# Patient Record
Sex: MALE | Race: WHITE | Employment: FULL TIME | URBAN - METROPOLITAN AREA
[De-identification: names, ages, dates, MRNs, and addresses within clinical notes are randomized per-mention and may not be internally consistent; named-entity substitution may affect disease eponyms.]

---

## 2022-02-28 ENCOUNTER — HOSPITAL ENCOUNTER (OUTPATIENT)
Dept: GENERAL RADIOLOGY | Age: 37
Discharge: HOME | End: 2022-02-28
Payer: COMMERCIAL

## 2022-02-28 ENCOUNTER — TRANSCRIBE ORDER (OUTPATIENT)
Dept: GENERAL RADIOLOGY | Age: 37
End: 2022-02-28

## 2022-02-28 DIAGNOSIS — M54.50 LOW BACK PAIN AT MULTIPLE SITES: ICD-10-CM

## 2022-02-28 DIAGNOSIS — M25.559 HIP PAIN: ICD-10-CM

## 2022-02-28 DIAGNOSIS — M25.559 HIP PAIN: Primary | ICD-10-CM

## 2022-02-28 PROCEDURE — 73502 X-RAY EXAM HIP UNI 2-3 VIEWS: CPT | Performed by: INTERNAL MEDICINE

## 2022-02-28 PROCEDURE — 72100 X-RAY EXAM L-S SPINE 2/3 VWS: CPT | Performed by: INTERNAL MEDICINE

## 2022-04-21 ENCOUNTER — OFFICE VISIT (OUTPATIENT)
Dept: ORTHOPEDIC SURGERY | Age: 37
End: 2022-04-21
Payer: COMMERCIAL

## 2022-04-21 DIAGNOSIS — G89.29 CHRONIC BILATERAL LOW BACK PAIN WITH LEFT-SIDED SCIATICA: Primary | ICD-10-CM

## 2022-04-21 DIAGNOSIS — M51.26 HNP (HERNIATED NUCLEUS PULPOSUS), LUMBAR: ICD-10-CM

## 2022-04-21 DIAGNOSIS — M54.42 CHRONIC BILATERAL LOW BACK PAIN WITH LEFT-SIDED SCIATICA: Primary | ICD-10-CM

## 2022-04-21 PROCEDURE — 99204 OFFICE O/P NEW MOD 45 MIN: CPT | Performed by: PHYSICIAN ASSISTANT

## 2022-04-21 NOTE — PROGRESS NOTES
1. Have you been to the ER, urgent care clinic since your last visit? Hospitalized since your last visit? No    2. Have you seen or consulted any other health care providers outside of the [address] [place] [address] since your last visit? Include any pap smears or colon screening. No       Chief Complaint   Patient presents with    Back Pain     mostly his left hamstring pain    patient reports when he pushes on L5/S1 his pain flares up otherwise he does not have much back pain.        Lesly Morin RN, BSN  Registered Nurse to [facility] [phone]  Direct Line [phone]

## 2022-04-26 NOTE — PROGRESS NOTES
Diana Espinoza (: 1985) is a 30 y.o. male patient here for evaluation of the following chief complaint(s):  Back Pain (mostly his left hamstring pain )         ASSESSMENT/PLAN:  Below is the assessment and plan developed based on review of pertinent history, physical exam, labs, studies, and medications. 1. Chronic bilateral low back pain with left-sided sciatica  2. HNP (herniated nucleus pulposus), lumbar      The patient's radiologic findings have been reviewed with him in detail today. He has had persistent and progressively worsening left lower extremity radiculopathy with intermittent low back pain. He has tried and failed conservative treatment to include 3 months of outpatient physical therapy as well as multiple medications as prescribed by his PCP. I would like for him to obtain MRI of the lumbar spine for further evaluation of an acute HNP. He will return for follow-up visit after his MRI is complete. He may continue using over-the-counter medications as needed for pain relief. Return for MRI follow up. SUBJECTIVE/OBJECTIVE:  Diana Espinoza (: 1985) is a 30 y.o. male who presents today for the following:  Chief Complaint   Patient presents with    Back Pain     mostly his left hamstring pain         HPI   Mr. Mi Lamar presents today as a new patient to the practice. He states that he began with a sudden onset of left-sided buttock and hamstring discomfort without any precipitating event or injury approximately 1 year ago. He states that over , he had decline in his physical activity, and his activity picked up again nearly 1 year ago. He states that by the end of the summer, his symptoms have progressed to left-sided deep buttock discomfort particularly with standing and was intermittent in nature with intermittent low back pain. He has had intermittent pain radiating along the posterior aspect of the leg into the calf and occasionally into the toes.   Reports occasional numbness and tingling in the toes but denies any weakness in the legs. He was seen by his PCP in January and was placed on a Medrol pack, diclofenac, and Flexeril. Unfortunately these provided him without any substantial relief. He is currently only using aspirin at this time. He has completed 3 months of outpatient physical therapy recently with only limited improvement. He continues to exercise regularly. He is here today for further evaluation and treatment recommendations. IMAGING:  XR Results (most recent):  Results from Hospital Encounter encounter on 02/28/22    XR SPINE LUMB 2 OR 3 V    Narrative  INDICATION:  Low back pain at multiple sites. Exam: AP, lateral and cone-down views of the lumbar spine. FINDINGS: There is no acute fracture or subluxation. Intervertebral disc spaces  are well maintained. Bones are well-mineralized. Soft tissues are normal.    Impression  No acute fracture or subluxation. MRI Results (most recent):  No results found for this or any previous visit. No Known Allergies    No current outpatient medications on file. No current facility-administered medications for this visit. Past Medical History:   Diagnosis Date    Arthritis         No past surgical history on file. No family history on file. Social History     Tobacco Use    Smoking status: Never Smoker    Smokeless tobacco: Never Used   Substance Use Topics    Alcohol use: Yes     Alcohol/week: 8.0 standard drinks     Types: 4 Glasses of wine, 4 Cans of beer per week     Comment: per week        Review of Systems   Constitutional: Negative. Respiratory: Negative. Cardiovascular: Negative. Gastrointestinal: Negative. Endocrine: Negative. Genitourinary: Negative. Musculoskeletal: Positive for back pain. Skin: Negative. Allergic/Immunologic: Negative. Hematological: Negative. Psychiatric/Behavioral: Negative.     All other systems reviewed and are negative. ROS     Positive for: Musculoskeletal    Last edited by Bharti Gonzalez RN on 4/21/2022  1:58 PM. (History)         Pain Assessment  4/21/2022   Location of Pain Back   Location Modifiers Left   Severity of Pain 3   Quality of Pain Dull;Aching; Sharp   Duration of Pain A few days   Aggravating Factors Other (Comment); Exercise   Relieving Factors Rest   Relieving Factors Comment laying on abdomin   Result of Injury No   Type of Injury (No Data)   Type of Injury Comment after endurance sports/ biking           Vitals: There were no vitals taken for this visit. There is no height or weight on file to calculate BMI. Physical Exam    Neurologic  Sensory  Light Touch - Intact - Globally. Overall Assessment of Muscle Strength and Tone reveals  Lower Extremities - Right Iliopsoas - 5/5. Left Iliopsoas - 5/5. Right Tibialis Anterior - 5/5. Left Tibialis Anterior - 5/5. Right Gastroc-Soleus - 5/5. Left Gastroc-Soleus - 5/5. Right EHL - 5/5. Left EHL - 4/5. General Assessment of Reflexes  Right Ankle - Clonus is not present. Left Ankle - Clonus is not present. Reflexes (Dermatomes)  2/2 Normal - Left Achilles (L5-S2), Left Knee (L2-4), Right Achilles (L5-S2) and Right Knee (L2-4). Musculoskeletal  Global Assessment  Examination of related systems reveals - well-developed, well-nourished, in no acute distress, alert and oriented x 3. Gait and Station - normal gait and station and normal posture. Right Lower Extremity - normal strength and tone, normal range of motion without pain and no instability, subluxation or laxity. Left Lower Extremity - normal strength and tone, normal range of motion without pain and no instability, subluxation or laxity. Spine/Ribs/Pelvis  Cervical Spine - Examination of the cervical spine reveals - no tenderness to palpation, no pain, no swelling, edema or erythema, normal cervical spine movements and normal sensation.  Thoracic (Dorsal) Spine - Examination of the thoracic spine reveals - no tenderness over thoracic vertebrae, no pain, normal sensation and normal thoracic spine movements. Lumbosacral Spine - Examination of the lumbosacral spine reveals - no known fractures or deformities. Inspection and Palpation - Tenderness - moderate. Assessment of pain reveals the following findings - The pain is characterized as - moderate. Location - pain refers to lower back bilaterally. ROJM - Trunk Extension - 15 degrees. Lumbar Spine Flexion - 35 °. Lumbosacral Spine - Functional Testing - Babinski Test negative, Prone Knee Bending Test negative, Slump Test negative, Straight Leg Raising Test positive on the left at approximately 45 degrees. Dr. Isela Garcia was available for immediate consult during this encounter. An electronic signature was used to authenticate this note.   -- BABATUNDE Estrada

## 2022-05-09 ENCOUNTER — TELEPHONE (OUTPATIENT)
Dept: ORTHOPEDIC SURGERY | Age: 37
End: 2022-05-09

## 2022-05-09 ENCOUNTER — PATIENT MESSAGE (OUTPATIENT)
Dept: ORTHOPEDIC SURGERY | Age: 37
End: 2022-05-09

## 2022-05-09 DIAGNOSIS — M54.42 CHRONIC BILATERAL LOW BACK PAIN WITH LEFT-SIDED SCIATICA: Primary | ICD-10-CM

## 2022-05-09 DIAGNOSIS — G89.29 CHRONIC BILATERAL LOW BACK PAIN WITH LEFT-SIDED SCIATICA: Primary | ICD-10-CM

## 2022-05-09 RX ORDER — GABAPENTIN 300 MG/1
CAPSULE ORAL
Qty: 60 CAPSULE | Refills: 0 | Status: SHIPPED | OUTPATIENT
Start: 2022-05-09 | End: 2022-05-09

## 2022-05-09 RX ORDER — GABAPENTIN 300 MG/1
CAPSULE ORAL
Qty: 60 CAPSULE | Refills: 0 | Status: SHIPPED | OUTPATIENT
Start: 2022-05-09

## 2022-05-09 NOTE — TELEPHONE ENCOUNTER
Patient is asking for medication to get him to his F/U to review the MRI on 05/23/2022. Last Office Visit  The patient's radiologic findings have been reviewed with him in detail today. He has had persistent and progressively worsening left lower extremity radiculopathy with intermittent low back pain. He has tried and failed conservative treatment to include 3 months of outpatient physical therapy as well as multiple medications as prescribed by his PCP. I would like for him to obtain MRI of the lumbar spine for further evaluation of an acute HNP. He will return for follow-up visit after his MRI is complete. He may continue using over-the-counter medications as needed for pain relief.     MRI FINDINGS  L5-S1: Broad-based left central disc protrusion. Mass effect on the descending  left S1 nerve in the subarticular zone. Mild central spinal canal stenosis. Mild  left foraminal stenosis.     IMPRESSION  L5-S1 left central disc protrusion likely affects the descending left S1 nerve. From: Breana Alcaraz  To: BABATUNDE Mills  Sent: 5/9/2022  8:33 AM EDT  Subject: MRI follow up    Good morning,    Since my last visit, my sciatic pain has been increasing daily. Now that we have the MRI results I was wondering - do you have any recommendations of how I could begin managing that pain in these two weeks prior to my next appointment?     Thanks,  Sangeeta Lara

## 2022-05-23 ENCOUNTER — OFFICE VISIT (OUTPATIENT)
Dept: ORTHOPEDIC SURGERY | Age: 37
End: 2022-05-23
Payer: COMMERCIAL

## 2022-05-23 DIAGNOSIS — M51.26 HNP (HERNIATED NUCLEUS PULPOSUS), LUMBAR: ICD-10-CM

## 2022-05-23 DIAGNOSIS — G89.29 CHRONIC BILATERAL LOW BACK PAIN WITH LEFT-SIDED SCIATICA: Primary | ICD-10-CM

## 2022-05-23 DIAGNOSIS — M54.42 CHRONIC BILATERAL LOW BACK PAIN WITH LEFT-SIDED SCIATICA: Primary | ICD-10-CM

## 2022-05-23 PROCEDURE — 99214 OFFICE O/P EST MOD 30 MIN: CPT | Performed by: PHYSICIAN ASSISTANT

## 2022-05-23 NOTE — PROGRESS NOTES
1. Have you been to the ER, urgent care clinic since your last visit? Hospitalized since your last visit? No    2. Have you seen or consulted any other health care providers outside of the [address] [place] [address] since your last visit? Include any pap smears or colon screening.  No    Chief Complaint   Patient presents with    Back Pain     Lumbar MRI Results 5/05/22

## 2022-05-25 NOTE — PROGRESS NOTES
Mabel Brunner (: 1985) is a 30 y.o. male patient here for evaluation of the following chief complaint(s):  Back Pain (Lumbar MRI Results 22)         ASSESSMENT/PLAN:  Below is the assessment and plan developed based on review of pertinent history, physical exam, labs, studies, and medications. 1. Chronic bilateral low back pain with left-sided sciatica  -     REFERRAL TO SPINE INJECTION; Future  2. HNP (herniated nucleus pulposus), lumbar  -     REFERRAL TO SPINE INJECTION; Future      The patient's radiologic findings have been reviewed with him in detail today. He has a left-sided disc herniation at L5-S1 which correlates with his symptoms. We have discussed various treatment options, and I think that he is a candidate for left-sided L5-S1 epidural steroid injection. I would like for him to complete 2 rounds of epidural injections, he will return for a follow-up visit in 2 months to see Dr. Carin Thacker. He may be a candidate for surgical intervention if his symptoms fail to improve. Return in about 2 months (around 2022) for With Dr. Carin Thacker, Injection follow up (possible surgery consult). SUBJECTIVE/OBJECTIVE:  Mabel Brunner (: 1985) is a 30 y.o. male who presents today for the following:  Chief Complaint   Patient presents with    Back Pain     Lumbar MRI Results 22        HPI   Mr. Josr Marrero in today for a MRI follow-up. Since his last office visit, he states that his left leg symptoms have worsened. He had gabapentin prescribed for him, however he has decided to hold off on trying this. His history as noted below. He states that he began with a sudden onset of left-sided buttock and hamstring discomfort without any precipitating event or injury approximately 1 year ago. He states that over , he had decline in his physical activity, and his activity picked up again nearly 1 year ago.   He states that by the end of the summer, his symptoms have progressed to left-sided deep buttock discomfort particularly with standing and was intermittent in nature with intermittent low back pain. He has had intermittent pain radiating along the posterior aspect of the leg into the calf and occasionally into the toes. Reports occasional numbness and tingling in the toes but denies any weakness in the legs. He was seen by his PCP in January and was placed on a Medrol pack, diclofenac, and Flexeril. Unfortunately these provided him without any substantial relief. He is currently only using aspirin at this time. He has completed 3 months of outpatient physical therapy recently with only limited improvement. He continues to exercise regularly. IMAGING:  XR Results (most recent):  Results from Hospital Encounter encounter on 02/28/22    XR SPINE LUMB 2 OR 3 V    Narrative  INDICATION:  Low back pain at multiple sites. Exam: AP, lateral and cone-down views of the lumbar spine. FINDINGS: There is no acute fracture or subluxation. Intervertebral disc spaces  are well maintained. Bones are well-mineralized. Soft tissues are normal.    Impression  No acute fracture or subluxation. MRI Results (most recent):  Results from Appointment encounter on 05/05/22    MRI LUMB SPINE WO CONT    Narrative  EXAM: MRI LUMB SPINE WO CONT    INDICATION: Chronic low back pain and left sciatica. COMPARISON: None    TECHNIQUE: MR imaging of the lumbar spine was performed using the following  sequences: sagittal T1, T2, STIR;  axial T1, T2 performed on a 3 Betzaida magnet. CONTRAST:  None. FINDINGS:    There is normal alignment of the lumbar spine. Vertebral body heights are  maintained. Marrow signal is normal. Mild disc desiccation at L5-S1. No  discitis. The conus medullaris terminates at L1-L2. Signal and caliber of the distal  spinal cord are within normal limits. The paraspinal soft tissues are within normal limits. Lower thoracic spine: No herniation or stenosis.     L1-L2: No herniation or stenosis. L2-L3: No herniation or stenosis. L3-L4: No herniation or stenosis. L4-L5: Minimal diffuse disc bulge. No stenosis. L5-S1: Broad-based left central disc protrusion. Mass effect on the descending  left S1 nerve in the subarticular zone. Mild central spinal canal stenosis. Mild  left foraminal stenosis. Impression  L5-S1 left central disc protrusion likely affects the descending left S1 nerve. No Known Allergies    Current Outpatient Medications   Medication Sig    gabapentin (NEURONTIN) 300 mg capsule Take 1 to 2 tablets at bedtime (Patient not taking: Reported on 5/23/2022)     No current facility-administered medications for this visit. Past Medical History:   Diagnosis Date    Arthritis         History reviewed. No pertinent surgical history. History reviewed. No pertinent family history. Social History     Tobacco Use    Smoking status: Never Smoker    Smokeless tobacco: Never Used   Substance Use Topics    Alcohol use: Yes     Alcohol/week: 8.0 standard drinks     Types: 4 Glasses of wine, 4 Cans of beer per week     Comment: per week        Review of Systems   Constitutional: Negative. Respiratory: Negative. Cardiovascular: Negative. Gastrointestinal: Negative. Endocrine: Negative. Genitourinary: Negative. Musculoskeletal: Positive for back pain. Skin: Negative. Allergic/Immunologic: Negative. Hematological: Negative. Psychiatric/Behavioral: Negative. All other systems reviewed and are negative. Pain Assessment  4/21/2022   Location of Pain Back   Location Modifiers Left   Severity of Pain 3   Quality of Pain Dull;Aching; Sharp   Duration of Pain A few days   Aggravating Factors Other (Comment); Exercise   Relieving Factors Rest   Relieving Factors Comment laying on abdomin   Result of Injury No   Type of Injury (No Data)   Type of Injury Comment after endurance sports/ biking           Vitals:   There were no vitals taken for this visit. There is no height or weight on file to calculate BMI. Physical Exam    Neurologic  Sensory  Light Touch - Intact - Globally. Overall Assessment of Muscle Strength and Tone reveals  Lower Extremities - Right Iliopsoas - 5/5. Left Iliopsoas - 5/5. Right Tibialis Anterior - 5/5. Left Tibialis Anterior - 5/5. Right Gastroc-Soleus - 5/5. Left Gastroc-Soleus - 5/5. Right EHL - 5/5. Left EHL - 4/5. General Assessment of Reflexes  Right Ankle - Clonus is not present. Left Ankle - Clonus is not present. Reflexes (Dermatomes)  2/2 Normal - Left Achilles (L5-S2), Left Knee (L2-4), Right Achilles (L5-S2) and Right Knee (L2-4). Musculoskeletal  Global Assessment  Examination of related systems reveals - well-developed, well-nourished, in no acute distress, alert and oriented x 3. Gait and Station - normal gait and station and normal posture. Right Lower Extremity - normal strength and tone, normal range of motion without pain and no instability, subluxation or laxity. Left Lower Extremity - normal strength and tone, normal range of motion without pain and no instability, subluxation or laxity. Spine/Ribs/Pelvis  Cervical Spine - Examination of the cervical spine reveals - no tenderness to palpation, no pain, no swelling, edema or erythema, normal cervical spine movements and normal sensation. Thoracic (Dorsal) Spine - Examination of the thoracic spine reveals - no tenderness over thoracic vertebrae, no pain, normal sensation and normal thoracic spine movements. Lumbosacral Spine - Examination of the lumbosacral spine reveals - no known fractures or deformities. Inspection and Palpation - Tenderness - moderate. Assessment of pain reveals the following findings - The pain is characterized as - moderate. Location - pain refers to lower back bilaterally. ROJM - Trunk Extension - 15 degrees. Lumbar Spine Flexion - 35 °.  Lumbosacral Spine - Functional Testing - Babinski Test negative, Prone Knee Bending Test negative, Slump Test negative, Straight Leg Raising Test positive on the left at approximately 45 degrees. Dr. Janis Lucero was available for immediate consult during this encounter. An electronic signature was used to authenticate this note.   -- BABATUNDE Razo

## 2022-08-02 ENCOUNTER — OFFICE VISIT (OUTPATIENT)
Dept: ORTHOPEDIC SURGERY | Age: 37
End: 2022-08-02
Payer: COMMERCIAL

## 2022-08-02 DIAGNOSIS — M51.26 HNP (HERNIATED NUCLEUS PULPOSUS), LUMBAR: ICD-10-CM

## 2022-08-02 DIAGNOSIS — G89.29 CHRONIC BILATERAL LOW BACK PAIN WITH LEFT-SIDED SCIATICA: Primary | ICD-10-CM

## 2022-08-02 DIAGNOSIS — M54.42 CHRONIC BILATERAL LOW BACK PAIN WITH LEFT-SIDED SCIATICA: Primary | ICD-10-CM

## 2022-08-02 PROCEDURE — 99213 OFFICE O/P EST LOW 20 MIN: CPT | Performed by: PHYSICIAN ASSISTANT

## 2022-08-02 NOTE — PROGRESS NOTES
Clara Cooper (: 1985) is a 30 y.o. male patient here for evaluation of the following chief complaint(s):  Back Pain (Inj f/u/)         ASSESSMENT/PLAN:  Below is the assessment and plan developed based on review of pertinent history, physical exam, labs, studies, and medications. 1. Chronic bilateral low back pain with left-sided sciatica  2. HNP (herniated nucleus pulposus), lumbar    Mr. Asher Kellogg done very well with 2 rounds of recent epidural steroid injections. He will likely schedule his third planned round of injections, and will continue with home exercises. His symptoms have largely resolved, and he would like to continue with conservative measures. He will use over-the-counter medications. We will see him back for follow-up visit on an as-needed basis. Return if symptoms worsen or fail to improve. SUBJECTIVE/OBJECTIVE:  Clara Cooper (: 1985) is a 30 y.o. male who presents today for the following:  Chief Complaint   Patient presents with    Back Pain     Inj f/u          HPI   Mr. Asher Kellogg returns today for a follow up visit. Since his last office visit he has had 2 rounds of lumbar epidural steroid injections, his most recent being from approximately 2 weeks ago. He has had marked improvement in his symptoms, and feels that he is significantly improved. He has minimal fluctuating symptoms. He does have a third epidural injection scheduled, but is likely to cancel this in order to save it for a later date if needed. He continues with home exercises and stretching. His history as noted below. He states that he began with a sudden onset of left-sided buttock and hamstring discomfort without any precipitating event or injury over a 1 year ago. He states that over [place], he had a decline in his physical activity, and his activity picked up again nearly 1 year ago.   He states that by the end of the summer, his symptoms have progressed to left-sided deep buttock discomfort particularly with standing and was intermittent in nature with intermittent low back pain. He has had intermittent pain radiating along the posterior aspect of the leg into the calf and occasionally into the toes. Reports occasional numbness and tingling in the toes but denies any weakness in the legs. He was seen by his PCP in January and was placed on a Medrol pack, diclofenac, and Flexeril. Unfortunately these provided him without any substantial relief. He is currently only using aspirin at this time. He has completed 3 months of outpatient physical therapy recently with only limited improvement. He continues to exercise regularly. IMAGING:  XR Results (most recent):  Results from Hospital Encounter encounter on 02/28/22    XR SPINE LUMB 2 OR 3 V    Narrative  INDICATION:  Low back pain at multiple sites. Exam: AP, lateral and cone-down views of the lumbar spine. FINDINGS: There is no acute fracture or subluxation. Intervertebral disc spaces  are well maintained. Bones are well-mineralized. Soft tissues are normal.    Impression  No acute fracture or subluxation. MRI Results (most recent):  Results from Appointment encounter on 05/05/22    MRI LUMB SPINE WO CONT    Narrative  EXAM: MRI LUMB SPINE WO CONT    INDICATION: Chronic low back pain and left sciatica. COMPARISON: None    TECHNIQUE: MR imaging of the lumbar spine was performed using the following  sequences: sagittal T1, T2, STIR;  axial T1, T2 performed on a 3 Betzaida magnet. CONTRAST:  None. FINDINGS:    There is normal alignment of the lumbar spine. Vertebral body heights are  maintained. Marrow signal is normal. Mild disc desiccation at L5-S1. No  discitis. The conus medullaris terminates at L1-L2. Signal and caliber of the distal  spinal cord are within normal limits. The paraspinal soft tissues are within normal limits. Lower thoracic spine: No herniation or stenosis.     L1-L2: No herniation or stenosis. L2-L3: No herniation or stenosis. L3-L4: No herniation or stenosis. L4-L5: Minimal diffuse disc bulge. No stenosis. L5-S1: Broad-based left central disc protrusion. Mass effect on the descending  left S1 nerve in the subarticular zone. Mild central spinal canal stenosis. Mild  left foraminal stenosis. Impression  L5-S1 left central disc protrusion likely affects the descending left S1 nerve. No Known Allergies    Current Outpatient Medications   Medication Sig    gabapentin (NEURONTIN) 300 mg capsule Take 1 to 2 tablets at bedtime (Patient not taking: No sig reported)     No current facility-administered medications for this visit. Past Medical History:   Diagnosis Date    Arthritis         History reviewed. No pertinent surgical history. History reviewed. No pertinent family history. Social History     Tobacco Use    Smoking status: Never    Smokeless tobacco: Never   Substance Use Topics    Alcohol use: Yes     Alcohol/week: 8.0 standard drinks     Types: 4 Glasses of wine, 4 Cans of beer per week     Comment: per week        Review of Systems   Constitutional: Negative. Respiratory: Negative. Cardiovascular: Negative. Gastrointestinal: Negative. Endocrine: Negative. Genitourinary: Negative. Musculoskeletal:  Positive for back pain. Skin: Negative. Allergic/Immunologic: Negative. Hematological: Negative. Psychiatric/Behavioral: Negative. All other systems reviewed and are negative. Pain Assessment  4/21/2022   Location of Pain Back   Location Modifiers Left   Severity of Pain 3   Quality of Pain Dull;Aching; Sharp   Duration of Pain A few days   Aggravating Factors Other (Comment); Exercise   Relieving Factors Rest   Relieving Factors Comment laying on abdomin   Result of Injury No   Type of Injury (No Data)   Type of Injury Comment after endurance sports/ biking           Vitals: There were no vitals taken for this visit.    There is no height or weight on file to calculate BMI. Physical Exam    Neurologic  Sensory  Light Touch - Intact - Globally. Overall Assessment of Muscle Strength and Tone reveals  Lower Extremities - Right Iliopsoas - 5/5. Left Iliopsoas - 5/5. Right Tibialis Anterior - 5/5. Left Tibialis Anterior - 5/5. Right Gastroc-Soleus - 5/5. Left Gastroc-Soleus - 5/5. Right EHL - 5/5. Left EHL - 5/5. General Assessment of Reflexes  Right Ankle - Clonus is not present. Left Ankle - Clonus is not present. Reflexes (Dermatomes)  2/2 Normal - Left Achilles (L5-S2), Left Knee (L2-4), Right Achilles (L5-S2) and Right Knee (L2-4). Musculoskeletal  Global Assessment  Examination of related systems reveals - well-developed, well-nourished, in no acute distress, alert and oriented x 3. Gait and Station - normal gait and station and normal posture. Right Lower Extremity - normal strength and tone, normal range of motion without pain and no instability, subluxation or laxity. Left Lower Extremity - normal strength and tone, normal range of motion without pain and no instability, subluxation or laxity. Spine/Ribs/Pelvis  Cervical Spine - Examination of the cervical spine reveals - no tenderness to palpation, no pain, no swelling, edema or erythema, normal cervical spine movements and normal sensation. Thoracic (Dorsal) Spine - Examination of the thoracic spine reveals - no tenderness over thoracic vertebrae, no pain, normal sensation and normal thoracic spine movements. Lumbosacral Spine - Examination of the lumbosacral spine reveals - no known fractures or deformities. Inspection and Palpation - Tenderness - moderate. Assessment of pain reveals the following findings - The pain is characterized as - moderate. Location - pain refers to lower back bilaterally. ROJM - Trunk Extension - 15 degrees. Lumbar Spine Flexion - 35 °.  Lumbosacral Spine - Functional Testing - Babinski Test negative, Prone Knee Bending Test negative, Slump Test negative, Straight Leg Raising Test negative. Dr. Valorie Ford was available for immediate consult during this encounter. An electronic signature was used to authenticate this note.   -- [facility], [place], [place]

## 2022-08-02 NOTE — PROGRESS NOTES
1. Have you been to the ER, urgent care clinic since your last visit? Hospitalized since your last visit? No    2. Have you seen or consulted any other health care providers outside of the [address] [place] [address] since your last visit? Include any pap smears or colon screening.  No    Chief Complaint   Patient presents with    Back Pain     Inj f/u